# Patient Record
Sex: MALE | Race: WHITE
[De-identification: names, ages, dates, MRNs, and addresses within clinical notes are randomized per-mention and may not be internally consistent; named-entity substitution may affect disease eponyms.]

---

## 2017-01-04 ENCOUNTER — HOSPITAL ENCOUNTER (OUTPATIENT)
Dept: HOSPITAL 47 - LABWHC1 | Age: 62
Discharge: HOME | End: 2017-01-04
Attending: PSYCHIATRY & NEUROLOGY
Payer: OTHER GOVERNMENT

## 2017-01-04 DIAGNOSIS — G45.9: Primary | ICD-10-CM

## 2017-01-04 LAB
ANA W/REFLEX TO TITER: NEGATIVE
CH: 30.4
CHCM: 33.9
ERYTHROCYTE [DISTWIDTH] IN BLOOD BY AUTOMATED COUNT: 4.91 M/UL (ref 4.3–5.9)
ERYTHROCYTE [DISTWIDTH] IN BLOOD: 12.6 % (ref 11.5–15.5)
GLUCOSE SERPL-MCNC: 108 MG/DL (ref 74–99)
HCT VFR BLD AUTO: 44.2 % (ref 39–53)
HDW: 2.68
HGB BLD-MCNC: 15.1 GM/DL (ref 13–17.5)
MCH RBC QN AUTO: 30.8 PG (ref 25–35)
MCHC RBC AUTO-ENTMCNC: 34.2 G/DL (ref 31–37)
MCV RBC AUTO: 90.1 FL (ref 80–100)
VIT B12 SERPL-MCNC: 364 PG/ML (ref 239–931)
WBC # BLD AUTO: 5.7 K/UL (ref 3.8–10.6)

## 2017-01-04 PROCEDURE — 84443 ASSAY THYROID STIM HORMONE: CPT

## 2017-01-04 PROCEDURE — 86038 ANTINUCLEAR ANTIBODIES: CPT

## 2017-01-04 PROCEDURE — 84165 PROTEIN E-PHORESIS SERUM: CPT

## 2017-01-04 PROCEDURE — 83655 ASSAY OF LEAD: CPT

## 2017-01-04 PROCEDURE — 82607 VITAMIN B-12: CPT

## 2017-01-04 PROCEDURE — 86235 NUCLEAR ANTIGEN ANTIBODY: CPT

## 2017-01-04 PROCEDURE — 82947 ASSAY GLUCOSE BLOOD QUANT: CPT

## 2017-01-04 PROCEDURE — 84181 WESTERN BLOT TEST: CPT

## 2017-01-04 PROCEDURE — 86780 TREPONEMA PALLIDUM: CPT

## 2017-01-04 PROCEDURE — 82570 ASSAY OF URINE CREATININE: CPT

## 2017-01-04 PROCEDURE — 86225 DNA ANTIBODY NATIVE: CPT

## 2017-01-04 PROCEDURE — 85027 COMPLETE CBC AUTOMATED: CPT

## 2017-01-04 PROCEDURE — 83825 ASSAY OF MERCURY: CPT

## 2017-01-04 PROCEDURE — 36415 COLL VENOUS BLD VENIPUNCTURE: CPT

## 2017-01-04 PROCEDURE — 83090 ASSAY OF HOMOCYSTEINE: CPT

## 2017-01-04 PROCEDURE — 82175 ASSAY OF ARSENIC: CPT

## 2017-01-04 PROCEDURE — 83516 IMMUNOASSAY NONANTIBODY: CPT

## 2017-01-04 PROCEDURE — 84439 ASSAY OF FREE THYROXINE: CPT

## 2017-01-09 LAB
MERCURY BLD-MCNC: < 2 MCG/L (ref ?–11)
MIS TEST REQUESTED (BLOOD): (no result)

## 2017-01-16 LAB
MIS TEST REQUESTED (BLOOD): (no result)
MIS TEST REQUESTED (BLOOD): (no result)

## 2018-03-21 ENCOUNTER — HOSPITAL ENCOUNTER (EMERGENCY)
Dept: HOSPITAL 47 - EC | Age: 63
Discharge: OTHER INSTITUTION | End: 2018-03-21
Payer: OTHER GOVERNMENT

## 2018-03-21 VITALS — TEMPERATURE: 98.1 F | RESPIRATION RATE: 20 BRPM

## 2018-03-21 VITALS — DIASTOLIC BLOOD PRESSURE: 72 MMHG | SYSTOLIC BLOOD PRESSURE: 123 MMHG | HEART RATE: 72 BPM

## 2018-03-21 DIAGNOSIS — Z79.899: ICD-10-CM

## 2018-03-21 DIAGNOSIS — I61.9: Primary | ICD-10-CM

## 2018-03-21 DIAGNOSIS — Z79.82: ICD-10-CM

## 2018-03-21 DIAGNOSIS — R40.2412: ICD-10-CM

## 2018-03-21 DIAGNOSIS — H53.8: ICD-10-CM

## 2018-03-21 LAB
ALBUMIN SERPL-MCNC: 4.5 G/DL (ref 3.5–5)
ALP SERPL-CCNC: 71 U/L (ref 38–126)
ALT SERPL-CCNC: 52 U/L (ref 21–72)
ANION GAP SERPL CALC-SCNC: 11 MMOL/L
AST SERPL-CCNC: 38 U/L (ref 17–59)
BASOPHILS # BLD AUTO: 0 K/UL (ref 0–0.2)
BASOPHILS NFR BLD AUTO: 0 %
BUN SERPL-SCNC: 12 MG/DL (ref 9–20)
CALCIUM SPEC-MCNC: 9.6 MG/DL (ref 8.4–10.2)
CHLORIDE SERPL-SCNC: 105 MMOL/L (ref 98–107)
CO2 SERPL-SCNC: 24 MMOL/L (ref 22–30)
EOSINOPHIL # BLD AUTO: 0 K/UL (ref 0–0.7)
EOSINOPHIL NFR BLD AUTO: 1 %
ERYTHROCYTE [DISTWIDTH] IN BLOOD BY AUTOMATED COUNT: 5.13 M/UL (ref 4.3–5.9)
ERYTHROCYTE [DISTWIDTH] IN BLOOD: 12.4 % (ref 11.5–15.5)
GLUCOSE SERPL-MCNC: 113 MG/DL (ref 74–99)
HCT VFR BLD AUTO: 44.7 % (ref 39–53)
HGB BLD-MCNC: 15.6 GM/DL (ref 13–17.5)
LYMPHOCYTES # SPEC AUTO: 0.9 K/UL (ref 1–4.8)
LYMPHOCYTES NFR SPEC AUTO: 11 %
MCH RBC QN AUTO: 30.4 PG (ref 25–35)
MCHC RBC AUTO-ENTMCNC: 34.8 G/DL (ref 31–37)
MCV RBC AUTO: 87.2 FL (ref 80–100)
MONOCYTES # BLD AUTO: 0.3 K/UL (ref 0–1)
MONOCYTES NFR BLD AUTO: 4 %
NEUTROPHILS # BLD AUTO: 6.6 K/UL (ref 1.3–7.7)
NEUTROPHILS NFR BLD AUTO: 83 %
PLATELET # BLD AUTO: 227 K/UL (ref 150–450)
POTASSIUM SERPL-SCNC: 4.4 MMOL/L (ref 3.5–5.1)
PROT SERPL-MCNC: 7.6 G/DL (ref 6.3–8.2)
SODIUM SERPL-SCNC: 140 MMOL/L (ref 137–145)
WBC # BLD AUTO: 8 K/UL (ref 3.8–10.6)

## 2018-03-21 PROCEDURE — 99285 EMERGENCY DEPT VISIT HI MDM: CPT

## 2018-03-21 PROCEDURE — 70450 CT HEAD/BRAIN W/O DYE: CPT

## 2018-03-21 PROCEDURE — 36415 COLL VENOUS BLD VENIPUNCTURE: CPT

## 2018-03-21 PROCEDURE — 96361 HYDRATE IV INFUSION ADD-ON: CPT

## 2018-03-21 PROCEDURE — 93005 ELECTROCARDIOGRAM TRACING: CPT

## 2018-03-21 PROCEDURE — 96360 HYDRATION IV INFUSION INIT: CPT

## 2018-03-21 PROCEDURE — 80053 COMPREHEN METABOLIC PANEL: CPT

## 2018-03-21 PROCEDURE — 85025 COMPLETE CBC W/AUTO DIFF WBC: CPT

## 2018-03-21 NOTE — ED
Headache HPI





- General


Chief Complaint: Headache


Stated Complaint: HEAD INJURY


Time Seen by Provider: 03/21/18 09:20


Mode of arrival: wheelchair


Limitations: no limitations





- History of Present Illness


Initial Comments: 


62 years old gentleman had a headache and not being able to see well since noon 

yesterday, he has a history of firm brain bleed in the past when she had a 

headache he took some aspirin for the headache denies any blood thinners except 

and the aspirin he denies any trauma no fall.  He stated his vision is not very 

well but since yesterday he was not able to read well.  His vision has improved 

now but not 100%.  He is very active he runs about 2 miles every day, he has 

headache and poor vision no neck stiffness no chest pain or shortness of breath 

no abdominal pain no frequency urgency dysuria no symptoms of TIA or CVA though 

he has a history of CVA TIAs and has seen neurologist pretty religiously











- Related Data


 Home Medications











 Medication  Instructions  Recorded  Confirmed


 


Aspirin 81 mg PO DAILY 03/21/18 03/21/18


 


Multivitamins, Thera [Multivitamin 1 tab PO DAILY 03/21/18 03/21/18





(formulary)]   


 


Omega-3 Fatty Acids/Fish Oil [Fish 1 cap PO DAILY 03/21/18 03/21/18





Oil 1,000 mg Softgel]   


 


Pravastatin Sodium [Pravachol] 10 mg PO HS 03/21/18 03/21/18











 Allergies











Allergy/AdvReac Type Severity Reaction Status Date / Time


 


No Known Allergies Allergy   Verified 03/21/18 09:26














Review of Systems


ROS Statement: 


Those systems with pertinent positive or pertinent negative responses have been 

documented in the HPI.





ROS Other: All systems not noted in ROS Statement are negative.





Past Medical History


Additional Past Medical History / Comment(s): brain bleed


History of Any Multi-Drug Resistant Organisms: None Reported


Past Psychological History: No Psychological Hx Reported


Smoking Status: Never smoker


Past Alcohol Use History: Occasional


Past Drug Use History: None Reported





General Exam





- General Exam Comments


Initial Comments: 


General:  The patient is awake and alert, in no distress, and does not appear 

acutely ill.  GCS is 15


Skin:  Skin is warm and dry and no rashes or lesions are noted. 


Eye:  Pupils are equal, round and reactive to light, extra-ocular movements are 

intact; there is normal conjunctiva bilaterally.  


Ears, nose, mouth and throat:  There are moist mucous membranes and no oral 

lesions. 


Neck:  The neck is supple, there is no tenderness   


Cardiovascular:  There is a regular rate and rhythm. No murmur, rub or gallop 

is appreciated.


Respiratory: To auscultation bilateral, no wheezing no rhonchi no distress  

respiratory wise noticed


Gastrointestinal:  Soft, non-distended, non-tender abdomen without masses or 

organomegaly noted. There is no rebound or guarding present. Bowel sounds are 

unremarkable. 


Back:  There is no tenderness to palpation in the midline. There is no obvious 

deformity.


Musculoskeletal:  Normal ROM, no tenderness, There is no pedal edema. There is 

no calf tenderness or swelling. No cords were appreciated.  


Neurological:  CN II-XII intact, Cranial nerves III through XII are intact. 

There are no obvious motor or sensory deficits. Coordination appears grossly 

intact. Speech is normal.


Psychiatric:  Cooperative, appropriate mood & affect, normal judgment.  








Limitations: no limitations





Course


 Vital Signs











  03/21/18 03/21/18





  09:05 09:55


 


Temperature 98.1 F 


 


Pulse Rate 78 70


 


Respiratory 20 20





Rate  


 


Blood Pressure 135/75 142/76


 


O2 Sat by Pulse 100 99





Oximetry  








EKG is normal sinus rhythm ventricular rate 72 SC interval is 170 QRS durations 

100 QT/QTc is 398/435 review of this EKG reveals some T-wave flattening in 

leads 3 no ST elevation or ST depression noticed in the other leads








Radiology gave me verbal report about the hematoma on head CT this patient I'm 

just waiting for the official report so I could discuss with the receiving 

hospital and patient also not sure where he wants to go home I did mention that 

we could send him to [facility] he said he needs to discuss with the 

family as soon as he answers many or advises me about to his shortness of 

hospital will go completely transfer process





- Reevaluation(s)


Reevaluation #1: 


CT of the brain revealed  cerebral hematoma, it is 3.2 x 2 x 4.9 cm in size 

patient agreed to go to [facility]


03/21/18 10:59








Medical Decision Making





- Lab Data


Result diagrams: 


 03/21/18 09:51





 03/21/18 09:51


 Lab Results











  03/21/18 03/21/18 Range/Units





  09:51 09:51 


 


WBC  8.0   (3.8-10.6)  k/uL


 


RBC  5.13   (4.30-5.90)  m/uL


 


Hgb  15.6   (13.0-17.5)  gm/dL


 


Hct  44.7   (39.0-53.0)  %


 


MCV  87.2   (80.0-100.0)  fL


 


MCH  30.4   (25.0-35.0)  pg


 


MCHC  34.8   (31.0-37.0)  g/dL


 


RDW  12.4   (11.5-15.5)  %


 


Plt Count  227   (150-450)  k/uL


 


Neutrophils %  83   %


 


Lymphocytes %  11   %


 


Monocytes %  4   %


 


Eosinophils %  1   %


 


Basophils %  0   %


 


Neutrophils #  6.6   (1.3-7.7)  k/uL


 


Lymphocytes #  0.9 L   (1.0-4.8)  k/uL


 


Monocytes #  0.3   (0-1.0)  k/uL


 


Eosinophils #  0.0   (0-0.7)  k/uL


 


Basophils #  0.0   (0-0.2)  k/uL


 


Sodium   140  (137-145)  mmol/L


 


Potassium   4.4  (3.5-5.1)  mmol/L


 


Chloride   105  ()  mmol/L


 


Carbon Dioxide   24  (22-30)  mmol/L


 


Anion Gap   11  mmol/L


 


BUN   12  (9-20)  mg/dL


 


Creatinine   0.76  (0.66-1.25)  mg/dL


 


Est GFR (CKD-EPI)AfAm   >90  (>60 ml/min/1.73 sqM)  


 


Est GFR (CKD-EPI)NonAf   >90  (>60 ml/min/1.73 sqM)  


 


Glucose   113 H  (74-99)  mg/dL


 


Calcium   9.6  (8.4-10.2)  mg/dL


 


Total Bilirubin   0.9  (0.2-1.3)  mg/dL


 


AST   38  (17-59)  U/L


 


ALT   52  (21-72)  U/L


 


Alkaline Phosphatase   71  ()  U/L


 


Total Protein   7.6  (6.3-8.2)  g/dL


 


Albumin   4.5  (3.5-5.0)  g/dL














Disposition


Clinical Impression: 


 Headache, Blurred vision, Focal hematoma of cerebrum





Disposition: OTHER INSTITUTION NOT DEFINED


Condition: Fair


Referrals: 


MELINA Andrea III, MD [Primary Care Provider] - 1-2 days





- Out of Hospital Transfer - Req. Specs


Out of Hospital Transfer - Requested Specifics: Other Emergency Center (He 

should wants to go to [facility] at [facility], [facility] was 

contacted at 11 AM)

## 2022-12-24 ENCOUNTER — HOSPITAL ENCOUNTER (EMERGENCY)
Dept: HOSPITAL 47 - EC | Age: 67
Discharge: OTHER INSTITUTION | End: 2022-12-24
Payer: MEDICARE

## 2022-12-24 VITALS — TEMPERATURE: 98 F

## 2022-12-24 VITALS — SYSTOLIC BLOOD PRESSURE: 132 MMHG | HEART RATE: 75 BPM | DIASTOLIC BLOOD PRESSURE: 65 MMHG

## 2022-12-24 VITALS — RESPIRATION RATE: 16 BRPM

## 2022-12-24 DIAGNOSIS — I61.9: ICD-10-CM

## 2022-12-24 DIAGNOSIS — Z79.82: ICD-10-CM

## 2022-12-24 DIAGNOSIS — Z79.899: ICD-10-CM

## 2022-12-24 DIAGNOSIS — R41.82: Primary | ICD-10-CM

## 2022-12-24 DIAGNOSIS — Z86.73: ICD-10-CM

## 2022-12-24 DIAGNOSIS — I62.9: ICD-10-CM

## 2022-12-24 LAB
ALBUMIN SERPL-MCNC: 3.5 G/DL (ref 3.5–5)
ALP SERPL-CCNC: 61 U/L (ref 38–126)
ALT SERPL-CCNC: 18 U/L (ref 4–49)
ANION GAP SERPL CALC-SCNC: 5 MMOL/L
APTT BLD: 22.1 SEC (ref 22–30)
AST SERPL-CCNC: 26 U/L (ref 17–59)
BASOPHILS # BLD AUTO: 0.1 K/UL (ref 0–0.2)
BASOPHILS NFR BLD AUTO: 1 %
BUN SERPL-SCNC: 19 MG/DL (ref 9–20)
CALCIUM SPEC-MCNC: 8.4 MG/DL (ref 8.4–10.2)
CHLORIDE SERPL-SCNC: 106 MMOL/L (ref 98–107)
CO2 SERPL-SCNC: 25 MMOL/L (ref 22–30)
EOSINOPHIL # BLD AUTO: 0.1 K/UL (ref 0–0.7)
EOSINOPHIL NFR BLD AUTO: 2 %
ERYTHROCYTE [DISTWIDTH] IN BLOOD BY AUTOMATED COUNT: 4.07 M/UL (ref 4.3–5.9)
ERYTHROCYTE [DISTWIDTH] IN BLOOD: 12.7 % (ref 11.5–15.5)
GLUCOSE BLD-MCNC: 120 MG/DL (ref 70–110)
GLUCOSE SERPL-MCNC: 122 MG/DL (ref 74–99)
HCT VFR BLD AUTO: 35.9 % (ref 39–53)
HGB BLD-MCNC: 12.7 GM/DL (ref 13–17.5)
INR PPP: 1 (ref ?–1.2)
LYMPHOCYTES # SPEC AUTO: 2 K/UL (ref 1–4.8)
LYMPHOCYTES NFR SPEC AUTO: 32 %
MCH RBC QN AUTO: 31.3 PG (ref 25–35)
MCHC RBC AUTO-ENTMCNC: 35.5 G/DL (ref 31–37)
MCV RBC AUTO: 88.2 FL (ref 80–100)
MONOCYTES # BLD AUTO: 0.4 K/UL (ref 0–1)
MONOCYTES NFR BLD AUTO: 6 %
NEUTROPHILS # BLD AUTO: 3.5 K/UL (ref 1.3–7.7)
NEUTROPHILS NFR BLD AUTO: 55 %
PLATELET # BLD AUTO: 192 K/UL (ref 150–450)
POTASSIUM SERPL-SCNC: 3.9 MMOL/L (ref 3.5–5.1)
PROT SERPL-MCNC: 6 G/DL (ref 6.3–8.2)
PT BLD: 10.3 SEC (ref 9–12)
SODIUM SERPL-SCNC: 136 MMOL/L (ref 137–145)
WBC # BLD AUTO: 6.3 K/UL (ref 3.8–10.6)

## 2022-12-24 PROCEDURE — 71045 X-RAY EXAM CHEST 1 VIEW: CPT

## 2022-12-24 PROCEDURE — 70498 CT ANGIOGRAPHY NECK: CPT

## 2022-12-24 PROCEDURE — 70450 CT HEAD/BRAIN W/O DYE: CPT

## 2022-12-24 PROCEDURE — 93005 ELECTROCARDIOGRAM TRACING: CPT

## 2022-12-24 PROCEDURE — 99291 CRITICAL CARE FIRST HOUR: CPT

## 2022-12-24 PROCEDURE — 85610 PROTHROMBIN TIME: CPT

## 2022-12-24 PROCEDURE — 85730 THROMBOPLASTIN TIME PARTIAL: CPT

## 2022-12-24 PROCEDURE — 96374 THER/PROPH/DIAG INJ IV PUSH: CPT

## 2022-12-24 PROCEDURE — 84484 ASSAY OF TROPONIN QUANT: CPT

## 2022-12-24 PROCEDURE — 36415 COLL VENOUS BLD VENIPUNCTURE: CPT

## 2022-12-24 PROCEDURE — 80053 COMPREHEN METABOLIC PANEL: CPT

## 2022-12-24 PROCEDURE — 70496 CT ANGIOGRAPHY HEAD: CPT

## 2022-12-24 PROCEDURE — 85025 COMPLETE CBC W/AUTO DIFF WBC: CPT

## 2022-12-24 NOTE — XR
EXAMINATION TYPE: XR chest 1V portable

 

DATE OF EXAM: 12/24/2022

 

COMPARISON: NONE

 

HISTORY: Altered mental status

 

TECHNIQUE: Single view

 

FINDINGS: There is poor inspiration. No heart failure. Lungs are clear of consolidation. There are ch
est leads. Bony thorax is intact.

 

IMPRESSION: Poor inspiration. No acute lung disease.

## 2022-12-24 NOTE — CT
EXAMINATION TYPE: CT brain wo con for TPA

 

DATE OF EXAM: 12/24/2022

 

COMPARISON: 3/21/2018

 

HISTORY: neuro deficit, r/o stroke. prior on pacs

 

CT DLP: 1155.1 mGycm

Automated exposure control for dose reduction was used.

 

Images of the brain obtained with no contrast.

 

There is large area of high attenuation in the left parietal and left posterior temporal lobe consist
ent with acute hemorrhage. Abnormality measures approximately 7 x 5 cm. No significant midline shift.
 The calvarium is intact. There is some effacement of the occipital horn of the left lateral ventricl
e.

 

IMPRESSION:

Large acute parenchymal hemorrhage left temporal lobe and parietal lobe which is same location as the
 acute hemorrhage on old CT scan of 3/21/2018. Hemorrhage is more extensive on this exam.

 

Exam was discussed with the attending staff in the emergency room at 1:30 AM

## 2022-12-24 NOTE — ED
Altered Mental Status HPI





- General


Chief Complaint: Altered Mental Status


Stated Complaint: Possible Stroke


Time Seen by Provider: 12/24/22 00:50


Source: patient, RN notes reviewed, old records reviewed


Mode of arrival: EMS


Limitations: altered mental status





- History of Present Illness


Initial Comments: 





This is a 67-year-old male to the ER for evaluation.  Patient is brought in by 

EMS poor story secondary to clinical condition.  Patient is significantly 

neurological changes which she awoke with 3 and half hours after going to bed 

around 9 PM last night which was last time seen well to the emergency department


MD Complaint: altered mental status, confusion, decreased responsiveness, 

weakness


-: unknown


Severity: severe


Consistency of Symptoms: getting worse


Context: history of similar presentation


Associated Symptoms: denies other symptoms


Treatments Prior to Arrival: oxygen





- Related Data


                                Home Medications











 Medication  Instructions  Recorded  Confirmed


 


Aspirin 81 mg PO DAILY 03/21/18 03/21/18


 


Multivitamins, Thera [Multivitamin 1 tab PO DAILY 03/21/18 03/21/18





(formulary)]   


 


Omega-3 Fatty Acids/Fish Oil [Fish 1 cap PO DAILY 03/21/18 03/21/18





Oil 1,000 mg Softgel]   


 


Pravastatin Sodium [Pravachol] 10 mg PO HS 03/21/18 03/21/18











                                    Allergies











Allergy/AdvReac Type Severity Reaction Status Date / Time


 


No Known Allergies Allergy   Verified 03/21/18 09:26














Review of Systems


ROS Statement: 


Those systems with pertinent positive or pertinent negative responses have been 

documented in the HPI.





ROS Other: All systems not noted in ROS Statement are negative.





Past Medical History


Past Medical History: CVA/TIA


Additional Past Medical History / Comment(s): brain bleed


History of Any Multi-Drug Resistant Organisms: None Reported


Past Surgical History: No Surgical Hx Reported


Past Psychological History: No Psychological Hx Reported


Past Alcohol Use History: Occasional


Past Drug Use History: None Reported





General Exam





- General Exam Comments


Initial Comments: 





NIH > 20


Patient is maintaining and protecting airway


Limitations: altered mental status


General appearance: alert, anxious, in distress


Head exam: Present: atraumatic, normocephalic, normal inspection


Eye exam: Present: normal appearance, PERRL, EOMI.  Absent: scleral icterus, 

conjunctival injection, periorbital swelling


ENT exam: Present: normal exam, mucous membranes moist


Neck exam: Present: normal inspection.  Absent: tenderness, meningismus, lymph

adenopathy


Respiratory exam: Present: normal lung sounds bilaterally.  Absent: respiratory 

distress, wheezes, rales, rhonchi, stridor


Cardiovascular Exam: Present: regular rate, normal rhythm, normal heart sounds. 

 Absent: systolic murmur, diastolic murmur, rubs, gallop, clicks


GI/Abdominal exam: Present: soft, normal bowel sounds.  Absent: distended, 

tenderness, guarding, rebound, rigid


Extremities exam: Present: normal inspection, full ROM, normal capillary refill.

  Absent: tenderness, pedal edema, joint swelling, calf tenderness


Back exam: Present: normal inspection


Neurological exam: Present: alert, altered, oriented X3, CN II-XII intact


Psychiatric exam: Present: normal affect, normal mood


Skin exam: Present: warm, dry, intact, normal color.  Absent: rash





Course


                                   Vital Signs











  12/24/22 12/24/22 12/24/22





  00:51 00:54 01:13


 


Temperature  98 F 


 


Pulse Rate 74 752 H 73


 


Respiratory 20 20 20





Rate   


 


Blood Pressure 132/89 131/89 132/89


 


O2 Sat by Pulse 96 96 73 L





Oximetry   














- Reevaluation(s)


Reevaluation #1: 





12/24/22 01:04


Medical record is reviewed








Reevaluation #2: 





12/24/22 01:04


Code [facility] is paged prehospital





- Consultations


Consultation #1: 





Spoke with neurology Interventionalists will take transfer [facility] Dr. Reyes


Consultation #2: 





Spoke with ER, [facility] accept transfer





Medical Decision Making





- Medical Decision Making





67 male DF for evaluation of altered mental status awoke with symptoms 3 hours 

after going to bed.  Patient symptoms currently significant for right-sided 

weakness left-sided facial droop left-sided gaze.  Patient does have left-sided 

intracranial hemorrhage and will transferred [facility]





- Lab Data


Result diagrams: 


                                 12/24/22 01:02





                                   Lab Results











  12/24/22 12/24/22 Range/Units





  01:02 01:05 


 


WBC  6.3   (3.8-10.6)  k/uL


 


RBC  4.07 L   (4.30-5.90)  m/uL


 


Hgb  12.7 L   (13.0-17.5)  gm/dL


 


Hct  35.9 L   (39.0-53.0)  %


 


MCV  88.2   (80.0-100.0)  fL


 


MCH  31.3   (25.0-35.0)  pg


 


MCHC  35.5   (31.0-37.0)  g/dL


 


RDW  12.7   (11.5-15.5)  %


 


Plt Count  192   (150-450)  k/uL


 


MPV  7.3   


 


Neutrophils %  55   %


 


Lymphocytes %  32   %


 


Monocytes %  6   %


 


Eosinophils %  2   %


 


Basophils %  1   %


 


Neutrophils #  3.5   (1.3-7.7)  k/uL


 


Lymphocytes #  2.0   (1.0-4.8)  k/uL


 


Monocytes #  0.4   (0-1.0)  k/uL


 


Eosinophils #  0.1   (0-0.7)  k/uL


 


Basophils #  0.1   (0-0.2)  k/uL


 


POC Glucose (mg/dL)   120 H  ()  mg/dL


 


POC Glu Operater ID   Suresh Marte  














- EKG Data


-: EKG Interpreted by Me (EKG is sinus 74   )





- Radiology Data


Radiology results: report reviewed (CT brain CTA is positive for left-sided 

intracranial hemorrhage), image reviewed





Critical Care Time


Critical Care Time: Yes


Total Critical Care Time: 31





Disposition


Clinical Impression: 


 Altered mental status, Intracranial hemorrhage, Left-sided intracerebral 

hemorrhage





Disposition: OTHER INSTITUTION NOT DEFINED


Condition: Critical


Is patient prescribed a controlled substance at d/c from ED?: No


Referrals: 


MELINA Andrea III, MD [Primary Care Provider] - 1-2 days


Time of Disposition: 01:30





- Out of Hospital Transfer - Req. Specs


Out of Hospital Transfer - Requested Specifics: Other Emergency Center ([facility] 

[place])

## 2022-12-24 NOTE — CT
EXAMINATION TYPE: CT angio head neck

 

DATE OF EXAM: 12/24/2022

 

COMPARISON: None

 

HISTORY: neuro deficit, r/o stroke.

 

CT DLP: 593.5 mGycm

Automated exposure control for dose reduction was used.

 

CONTRAST: 

Performed with IV Contrast, patient injected with 65 mL of Isovue 370.

 

 

Images obtained from the aortic arch to the vertex of the brain with the IV contrast. There are Three
-D postprocessed images.

 

There is normal branching pattern of the great vessels on the aortic arch. There is arterial flow in 
both subclavian arteries.

 

There is arterial flow in the common internal and external carotid arteries bilaterally. There is art
erial flow in both vertebral arteries. No evidence of carotid or vertebral artery aneurysm or dissect
ion. No evidence of hemodynamic stenosis. Carotid artery bifurcations appear widely patent.

 

There is arterial flow in the anterior middle and posterior cerebral arteries. There is large area of
 high attenuation in the left temporal parietal lobe related to acute hemorrhage. No contrast extrava
sation. There is normal enhancement of the venous sinuses. No evidence of intracranial hemodynamic ar
terial stenosis. No significant mass effect in the left temporal parietal lobe.

 

IMPRESSION:

No evidence of a significant angiographic abnormality of the head and neck. Large acute hemorrhage in
 the left temporoparietal lobe.

## 2024-08-19 ENCOUNTER — HOSPITAL ENCOUNTER (EMERGENCY)
Dept: HOSPITAL 47 - EC | Age: 69
Discharge: NURSING, REHAB OR LONG-TERM CARE | End: 2024-08-19
Payer: MEDICARE

## 2024-08-19 PROCEDURE — 70450 CT HEAD/BRAIN W/O DYE: CPT

## 2024-08-19 PROCEDURE — 99291 CRITICAL CARE FIRST HOUR: CPT

## 2024-08-19 PROCEDURE — 90715 TDAP VACCINE 7 YRS/> IM: CPT

## 2024-09-09 NOTE — CT
Patient: Abhilash Wray Ordering Physician: Unknown, Unknown ID: [id number] Phone, Pager: Phone: N/A
 Pager: N/A : [phone] Age/Gender: 68Y, M Primary Location: N/A Procedure: CT brain wo con Study D
ate: 2024 7:21:36 PM Accession #: [id number]

 

EXAMINATION TYPE: CT brain wo con

CT DLP: 1157 mGycm, Automated exposure control for dose reduction was used.

 

DATE OF EXAM: 2024 7:43 PM

 

COMPARISON: 2022.

 

CLINICAL INDICATION: Face and head injury.

 

TECHNIQUE: 

Brain: Axial CT images of the brain were obtained with coronal and sagittal reformats created and rev
iewed.

Contrast used: None.

Oral contrast used: None.

 

FINDINGS:

 

Brain:

Extra-axial spaces: High density blood products seen within the sulci in the right frontal lobe .

Ventricular system: Within normal limits

Cerebral parenchyma: Remote injury to the left MCA territory. Including the left temporal lobe left f
rontal lobe with encephalomalacia. High density contusions seen bilaterally in the frontal lobes..  T
he gray-white junction is well differentiated.     

Cerebellum: Unremarkable.

Mass effect: No evidence of midline shift.

Intracranial vasculature: Atherosclerotic calcifications of the intracranial vessels.

Soft tissues: Right scalp edema

Calvarium/osseous structures: No depressed skull fracture.

Paranasal sinuses and mastoid air cells: Mild scattered paranasal sinus disease.

Visualized orbits: Orbital contents are intact.

 

IMPRESSION:

1.  Right subarachnoid hemorrhage and bilateral frontal lobe intraparenchymal cortical contusions. 

2.  Right scalp edema/contusion with out evidence of fracture.

3.  Sequela of prior left intraparenchymal hemorrhage.

 

Findings communicated to Dr. Gtuierrez on 2024 7:48 PM by Dr. Michel Hall.

## 2025-01-13 NOTE — CT
EXAMINATION TYPE: CT brain wo con

 

DATE OF EXAM: 3/21/2018

 

COMPARISON: NONE

 

HISTORY: Patient complains of headache and inability to read.

 

CT DLP: 1112 mGycm

Automated exposure control for dose reduction was used. Helical acquisition through the brain.

 

FINDINGS: 

High dense focus present in the posterior temporal occipital location measures approximately 3.2 x 2 
x 4.9 cm and shows some surrounding low density. There is extension from the inner table of the corine
rium to the level of the posterior horn of the left lateral ventricle. No evident hydrocephalus. No m
idline shift. Minimal local mass effect suspected. Basal ganglia calcifications are suspected. Cerebr
al vascular calcifications are present. Age-related atrophy is present. Calvarium is intact. Orbits s
how symmetric appearance. Paranasal sinuses and mastoid air cells are well aerated.

 

IMPRESSION: 

PARENCHYMAL CEREBRAL HEMATOMA AS DESCRIBED WITH SURROUNDING LOCAL EDEMA. CASE DISCUSSED WITH THE REFE
RRING CLINICIAN AT THE TIME OF PERFORMANCE OF THE EXAM. Mssg read